# Patient Record
Sex: MALE | Race: ASIAN
[De-identification: names, ages, dates, MRNs, and addresses within clinical notes are randomized per-mention and may not be internally consistent; named-entity substitution may affect disease eponyms.]

---

## 2021-09-28 PROBLEM — Z00.00 ENCOUNTER FOR PREVENTIVE HEALTH EXAMINATION: Status: ACTIVE | Noted: 2021-09-28

## 2021-10-01 ENCOUNTER — NON-APPOINTMENT (OUTPATIENT)
Age: 31
End: 2021-10-01

## 2021-10-01 ENCOUNTER — APPOINTMENT (OUTPATIENT)
Dept: HEMATOLOGY ONCOLOGY | Facility: CLINIC | Age: 31
End: 2021-10-01
Payer: MEDICAID

## 2021-10-01 ENCOUNTER — LABORATORY RESULT (OUTPATIENT)
Age: 31
End: 2021-10-01

## 2021-10-01 VITALS
HEIGHT: 70 IN | WEIGHT: 194 LBS | TEMPERATURE: 98.8 F | HEART RATE: 74 BPM | BODY MASS INDEX: 27.77 KG/M2 | SYSTOLIC BLOOD PRESSURE: 122 MMHG | DIASTOLIC BLOOD PRESSURE: 81 MMHG | OXYGEN SATURATION: 98 % | RESPIRATION RATE: 14 BRPM

## 2021-10-01 DIAGNOSIS — D76.1 HEMOPHAGOCYTIC LYMPHOHISTIOCYTOSIS: ICD-10-CM

## 2021-10-01 PROCEDURE — 85025 COMPLETE CBC W/AUTO DIFF WBC: CPT

## 2021-10-01 PROCEDURE — 99205 OFFICE O/P NEW HI 60 MIN: CPT

## 2021-10-01 NOTE — REVIEW OF SYSTEMS
[Fever] : fever [Chills] : chills [Night Sweats] : night sweats [Fatigue] : fatigue [Joint Pain] : joint pain [Muscle Pain] : muscle pain [Negative] : Allergic/Immunologic

## 2021-10-02 ENCOUNTER — LABORATORY RESULT (OUTPATIENT)
Age: 31
End: 2021-10-02

## 2021-10-04 LAB
ALBUMIN SERPL ELPH-MCNC: 4.3 G/DL
ALP BLD-CCNC: 112 U/L
ALT SERPL-CCNC: 29 U/L
ANA SER IF-ACNC: NEGATIVE
ANION GAP SERPL CALC-SCNC: 12 MMOL/L
APTT BLD: 36.8 SEC
AST SERPL-CCNC: 14 U/L
BASOPHILS # BLD AUTO: 0.03 K/UL
BASOPHILS NFR BLD AUTO: 0.4 %
BILIRUB DIRECT SERPL-MCNC: 0.1 MG/DL
BILIRUB INDIRECT SERPL-MCNC: 0.2 MG/DL
BILIRUB SERPL-MCNC: 0.3 MG/DL
BUN SERPL-MCNC: 9 MG/DL
CALCIUM SERPL-MCNC: 9.4 MG/DL
CHLORIDE SERPL-SCNC: 102 MMOL/L
CO2 SERPL-SCNC: 27 MMOL/L
CREAT SERPL-MCNC: 0.87 MG/DL
CRP SERPL-MCNC: 41 MG/L
DEPRECATED D DIMER PPP IA-ACNC: 356 NG/ML DDU
EOSINOPHIL # BLD AUTO: 0.16 K/UL
EOSINOPHIL NFR BLD AUTO: 1.9 %
ERYTHROCYTE [SEDIMENTATION RATE] IN BLOOD BY WESTERGREN METHOD: 60 MM/HR
FERRITIN SERPL-MCNC: 539 NG/ML
FIBRINOGEN PPP COAG.DERIVED-MCNC: 992 MG/DL
GLUCOSE SERPL-MCNC: 94 MG/DL
HAPTOGLOB SERPL-MCNC: 142 MG/DL
HBV CORE IGG+IGM SER QL: NONREACTIVE
HBV SURFACE AB SER QL: REACTIVE
HBV SURFACE AG SER QL: NONREACTIVE
HCT VFR BLD CALC: 39.4 %
HCV AB SER QL: NONREACTIVE
HCV S/CO RATIO: 0.27 S/CO
HGB BLD-MCNC: 11.9 G/DL
HIV1+2 AB SPEC QL IA.RAPID: NONREACTIVE
IMM GRANULOCYTES NFR BLD AUTO: 0.5 %
INR PPP: 1.12 RATIO
LDH SERPL-CCNC: 145 U/L
LYMPHOCYTES # BLD AUTO: 1.63 K/UL
LYMPHOCYTES NFR BLD AUTO: 19.8 %
MAGNESIUM SERPL-MCNC: 2.1 MG/DL
MAN DIFF?: NORMAL
MCHC RBC-ENTMCNC: 29 PG
MCHC RBC-ENTMCNC: 30.2 GM/DL
MCV RBC AUTO: 96.1 FL
MONOCYTES # BLD AUTO: 0.77 K/UL
MONOCYTES NFR BLD AUTO: 9.3 %
NEUTROPHILS # BLD AUTO: 5.61 K/UL
NEUTROPHILS NFR BLD AUTO: 68.1 %
PHOSPHATE SERPL-MCNC: 3.1 MG/DL
PLATELET # BLD AUTO: 401 K/UL
POTASSIUM SERPL-SCNC: 4.1 MMOL/L
PROT SERPL-MCNC: 8.2 G/DL
PT BLD: 13.2 SEC
RBC # BLD: 4.1 M/UL
RBC # FLD: 11.9 %
SODIUM SERPL-SCNC: 141 MMOL/L
TRIGL SERPL-MCNC: 110 MG/DL
URATE SERPL-MCNC: 5.6 MG/DL
WBC # FLD AUTO: 8.24 K/UL

## 2021-10-04 NOTE — HISTORY OF PRESENT ILLNESS
[de-identified] : Mr. Juan Stewart is a 31 year old male with PMHx presents today for initial consultation for concern of HLH, in setting of rising ferritin and crp levels without any evidence of secondary cause. Patient was referred to us by Dr. Bhavesh Arndt (infectious disease).\par Kaylin has on and off fever for 3 months, all started in August 2021. Patient received the 2nd covid 19 vaccine on 7/2021. His temp has been high as....He had body aches and myalgia. Patient had ID work up. Most recent blood work on 9/19/21 shows elevated ferritin of 607, elevated CRP 91, interleukin <31.2, WBC 9.0, Hgb 12.2, Plt 405, ALC 1.9, ANC 6.0. CMV, EBV, HIV, and were all negative. \par He continues to have fevers off/on-reports worse at night, max temp at 100.5 F. Today he report temp was normal. He has night sweats. He muscle and joint pain, rates pain 8/10 on 1 to 10 pains scale. He is currently at home and stopped going to work because of this. He take OTC ibuprofen with some relief.  No enlarged lymph nodes. Appetite is good . Weight is stable.. No CP/SOB. No medication history. No abd pain. No N/V/D. No hematuria. No dysuria. No history of recurrent infection.

## 2021-10-04 NOTE — ASSESSMENT
[FreeTextEntry1] : 31 year old male with no significant pmhx presents today for initial visit for possible HLH, sent in by his ID doctor for evaluation. Discussed the clinical nature of HLH with patient and patient's wife, clinically I am not convinced at this time that this is HLH, rather FUO (r/o inflammatory, infectious, lymphoproliferative disorder). No lymphadenopathy or organomegaly on physical exam. We well send off peripheral blood testing for the following: flow cytometry, chromosome analysis to r/o lymphoproliferative disorder, CBC with diff, CMP, LDH, Mg, Phos, hepatitis panel, interleukin, EBV by pcr, CMV by PCR, and HLH panel. Will have a PET/CT done to r/o lymphoproliferative disease.  We will refer to rheumatology for evaluation.\par \par Follow up pending results of today's testing.

## 2021-10-05 LAB
B BURGDOR AB SER-IMP: NEGATIVE
B BURGDOR IGM PATRN SER IB-IMP: NEGATIVE
B BURGDOR18KD IGG SER QL IB: NORMAL
B BURGDOR23KD IGG SER QL IB: NORMAL
B BURGDOR23KD IGM SER QL IB: NORMAL
B BURGDOR28KD IGG SER QL IB: NORMAL
B BURGDOR30KD IGG SER QL IB: NORMAL
B BURGDOR31KD IGG SER QL IB: NORMAL
B BURGDOR39KD IGG SER QL IB: NORMAL
B BURGDOR39KD IGM SER QL IB: PRESENT
B BURGDOR41KD IGG SER QL IB: NORMAL
B BURGDOR41KD IGM SER QL IB: NORMAL
B BURGDOR45KD IGG SER QL IB: NORMAL
B BURGDOR58KD IGG SER QL IB: NORMAL
B BURGDOR66KD IGG SER QL IB: PRESENT
B BURGDOR93KD IGG SER QL IB: NORMAL
CMV DNA SPEC QL NAA+PROBE: NOT DETECTED IU/ML
EBV DNA SERPL NAA+PROBE-ACNC: NOT DETECTED IU/ML

## 2021-10-06 LAB
A-TUMOR NECROSIS FACT SERPL-MCNC: 1.7 PG/ML
B BURGDOR DNA SPEC QL NAA+PROBE: NEGATIVE
COVID-19 NUCLEOCAPSID  GAM ANTIBODY INTERPRETATION: NEGATIVE
COVID-19 SPIKE DOMAIN ANTIBODY INTERPRETATION: POSITIVE
H CAPSUL AG SER IA-ACNC: NORMAL
IGNF SERPL-MCNC: <4.2 PG/ML
IL10 SERPL-MCNC: 3.8 PG/ML
IL12 SERPL-MCNC: <1.9 PG/ML
IL13 SERPL-MCNC: <1.7 PG/ML
IL17A SERPL-MCNC: <1.4 PG/ML
IL2 SERPL-MCNC: 381.6 PG/ML
IL2 SERPL-MCNC: 520.2 PG/ML
IL2 SERPL-MCNC: <2.1 PG/ML
IL4 SERPL-MCNC: <2.2 PG/ML
IL6 SERPL-MCNC: <2 PG/ML
IL8 SERPL-MCNC: <3 PG/ML
INTERLEUKIN 1 BETA: <6.5 PG/ML
INTERLEUKIN 5: <2.1 PG/ML
SARS-COV-2 AB SERPL IA-ACNC: >250 U/ML
SARS-COV-2 AB SERPL QL IA: 0.83 INDEX

## 2021-10-07 ENCOUNTER — LABORATORY RESULT (OUTPATIENT)
Age: 31
End: 2021-10-07

## 2021-10-14 ENCOUNTER — LABORATORY RESULT (OUTPATIENT)
Age: 31
End: 2021-10-14

## 2021-10-14 ENCOUNTER — APPOINTMENT (OUTPATIENT)
Dept: RHEUMATOLOGY | Facility: CLINIC | Age: 31
End: 2021-10-14
Payer: MEDICAID

## 2021-10-14 VITALS
TEMPERATURE: 97 F | HEIGHT: 70 IN | BODY MASS INDEX: 27.77 KG/M2 | HEART RATE: 78 BPM | WEIGHT: 194 LBS | RESPIRATION RATE: 16 BRPM | OXYGEN SATURATION: 99 % | SYSTOLIC BLOOD PRESSURE: 124 MMHG | DIASTOLIC BLOOD PRESSURE: 82 MMHG

## 2021-10-14 PROCEDURE — 99204 OFFICE O/P NEW MOD 45 MIN: CPT

## 2021-10-15 DIAGNOSIS — R50.9 FEVER, UNSPECIFIED: ICD-10-CM

## 2021-10-15 LAB
CORTICOSTEROID BIND GLOBULIN: 2.6 MG/DL
CORTIS SERPL-MCNC: 7.7 UG/DL
CORTISOL, FREE: 0.26 UG/DL
PFCX: 3.3 %

## 2021-10-25 ENCOUNTER — NON-APPOINTMENT (OUTPATIENT)
Age: 31
End: 2021-10-25

## 2021-10-25 LAB
24R-OH-CALCIDIOL SERPL-MCNC: 18 PG/ML
25(OH)D3 SERPL-MCNC: 14.1 NG/ML
ACE BLD-CCNC: 35 U/L
ALBUMIN SERPL ELPH-MCNC: 4.5 G/DL
ALP BLD-CCNC: 116 U/L
ALT SERPL-CCNC: 25 U/L
ANION GAP SERPL CALC-SCNC: 16 MMOL/L
APPEARANCE: CLEAR
AST SERPL-CCNC: 15 U/L
BACTERIA: NEGATIVE
BASOPHILS # BLD AUTO: 0.03 K/UL
BASOPHILS NFR BLD AUTO: 0.3 %
BILIRUB SERPL-MCNC: 0.2 MG/DL
BILIRUBIN URINE: NEGATIVE
BLOOD URINE: ABNORMAL
BUN SERPL-MCNC: 12 MG/DL
CALCIUM SERPL-MCNC: 9.8 MG/DL
CCP AB SER IA-ACNC: <8 UNITS
CHLORIDE SERPL-SCNC: 101 MMOL/L
CK SERPL-CCNC: 51 U/L
CO2 SERPL-SCNC: 24 MMOL/L
COLOR: NORMAL
CREAT SERPL-MCNC: 0.87 MG/DL
CREAT SPEC-SCNC: 98 MG/DL
CREAT/PROT UR: 0.2 RATIO
CRP SERPL-MCNC: 39 MG/L
DSDNA AB SER-ACNC: <12 IU/ML
ENA RNP AB SER IA-ACNC: 0.4 AL
ENA SM AB SER IA-ACNC: <0.2 AL
EOSINOPHIL # BLD AUTO: 0.14 K/UL
EOSINOPHIL NFR BLD AUTO: 1.6 %
ERYTHROCYTE [SEDIMENTATION RATE] IN BLOOD BY WESTERGREN METHOD: 120 MM/HR
FERRITIN SERPL-MCNC: 630 NG/ML
GLUCOSE QUALITATIVE U: NEGATIVE
GLUCOSE SERPL-MCNC: 83 MG/DL
HCT VFR BLD CALC: 41.1 %
HGB BLD-MCNC: 12.3 G/DL
HYALINE CASTS: 3 /LPF
IMM GRANULOCYTES NFR BLD AUTO: 0.6 %
KETONES URINE: NEGATIVE
LEUKOCYTE ESTERASE URINE: NEGATIVE
LYMPHOCYTES # BLD AUTO: 1.93 K/UL
LYMPHOCYTES NFR BLD AUTO: 22.4 %
MAN DIFF?: NORMAL
MCHC RBC-ENTMCNC: 28.8 PG
MCHC RBC-ENTMCNC: 29.9 GM/DL
MCV RBC AUTO: 96.3 FL
MICROSCOPIC-UA: NORMAL
MONOCYTES # BLD AUTO: 0.73 K/UL
MONOCYTES NFR BLD AUTO: 8.5 %
MPO AB + PR3 PNL SER: NORMAL
NEUTROPHILS # BLD AUTO: 5.75 K/UL
NEUTROPHILS NFR BLD AUTO: 66.6 %
NITRITE URINE: NEGATIVE
O+P BLD/T WRIGHT STN: NORMAL
PH URINE: 6
PLATELET # BLD AUTO: 387 K/UL
POTASSIUM SERPL-SCNC: 4.6 MMOL/L
PROT SERPL-MCNC: 8.4 G/DL
PROT UR-MCNC: 20 MG/DL
PROTEIN URINE: NORMAL
RBC # BLD: 4.27 M/UL
RBC # FLD: 12.2 %
RED BLOOD CELLS URINE: 6 /HPF
RF+CCP IGG SER-IMP: NEGATIVE
RHEUMATOID FACT SER QL: <10 IU/ML
SODIUM SERPL-SCNC: 141 MMOL/L
SPECIFIC GRAVITY URINE: 1.01
SQUAMOUS EPITHELIAL CELLS: 1 /HPF
UROBILINOGEN URINE: NORMAL
WBC # FLD AUTO: 8.63 K/UL
WHITE BLOOD CELLS URINE: 1 /HPF

## 2021-11-09 ENCOUNTER — NON-APPOINTMENT (OUTPATIENT)
Age: 31
End: 2021-11-09

## 2021-11-11 ENCOUNTER — APPOINTMENT (OUTPATIENT)
Dept: CT IMAGING | Facility: IMAGING CENTER | Age: 31
End: 2021-11-11
